# Patient Record
Sex: FEMALE | Race: BLACK OR AFRICAN AMERICAN | NOT HISPANIC OR LATINO | Employment: OTHER | URBAN - METROPOLITAN AREA
[De-identification: names, ages, dates, MRNs, and addresses within clinical notes are randomized per-mention and may not be internally consistent; named-entity substitution may affect disease eponyms.]

---

## 2017-07-19 ENCOUNTER — CARE COORDINATION (OUTPATIENT)
Dept: CARE COORDINATION | Facility: CLINIC | Age: 32
End: 2017-07-19

## 2017-07-19 NOTE — LETTER
[place] CARE COORDINATION  [facility]ly [facility] [address]  Phone: [phone]      July 19, 2017      Angeles De Souza  [address] [place] [address]    Dear Angeles,  I am the Clinic Care Coordinator that works with your primary care provider's clinic. I wanted to introduce myself and provide you with my contact information for you to be able to call me with any questions or concerns. Below is a description of what Clinic Care Coordination is and how I can further assist you.     The Clinic Care Coordinator role is a Registered Nurse and/or  who understands the health care system. The goal of Clinic Care Coordination is to help you manage your health and improve access to the [facility] system in the most efficient manner.  The Registered Nurse can assist you in meeting your health care goals by providing education, coordinating services, and strengthening the communication among your providers. The  can assist you with financial, behavioral, psychosocial, and chemical dependency and counseling/psychiatric resources.    Please feel free to keep this letter and contact information to contact me at [phone] with any further questions or concerns that may arise. We at [facility] are focused on providing you with the highest-quality healthcare experience possible and that all starts with you.       Sincerely,         Maggie Davidson RN  Clinic Care Coordinator   [facility]   Phone: [phone]

## 2017-07-19 NOTE — PROGRESS NOTES
Clinic Care Coordination Contact  [facility]/Voicemail    Referral Source: Pro-Active Outreach  Clinical Data: Care Coordinator Outreach  Outreach attempted x 1.  Left message on voicemail with call back information and requested return call.  Plan: Care Coordinator will mail out care coordination introduction letter with care coordinator contact information and explanation of care coordination services. Care Coordinator will try to reach patient again in 3-5 business days.

## 2017-08-31 NOTE — PROGRESS NOTES
Clinic Care Coordination Contact  [facility]/Voicemail    Referral Source: Pro-Active Outreach  Clinical Data: Care Coordinator Outreach  Outreach attempted x 2.   Plan: Care Coordinator mailed out care coordination introduction letter . Care Coordinator will try to reach patient again in 3-5 business days.

## 2017-09-13 NOTE — PROGRESS NOTES
Care Coordination Contact Attempt    Referral Source: pro active     Clinical Data: Clinic Care Coordinator RN attempted to contact patient three times.  Unable to contact letter was sent to patient . There has been no response from patient.        Plan:  Case closed.

## 2018-04-27 ENCOUNTER — OFFICE VISIT (OUTPATIENT)
Dept: FAMILY MEDICINE | Facility: CLINIC | Age: 33
End: 2018-04-27
Payer: COMMERCIAL

## 2018-04-27 VITALS
DIASTOLIC BLOOD PRESSURE: 62 MMHG | HEART RATE: 78 BPM | WEIGHT: 155 LBS | OXYGEN SATURATION: 100 % | SYSTOLIC BLOOD PRESSURE: 102 MMHG | TEMPERATURE: 99.5 F | BODY MASS INDEX: 25.79 KG/M2

## 2018-04-27 DIAGNOSIS — R30.0 DYSURIA: ICD-10-CM

## 2018-04-27 DIAGNOSIS — R11.0 NAUSEA: ICD-10-CM

## 2018-04-27 DIAGNOSIS — R10.30 ABDOMINAL PAIN, LOWER: Primary | ICD-10-CM

## 2018-04-27 LAB
ALBUMIN UR-MCNC: ABNORMAL MG/DL
APPEARANCE UR: ABNORMAL
BACTERIA #/AREA URNS HPF: ABNORMAL /HPF
BETA HCG QUAL IFA URINE: NEGATIVE
BILIRUB UR QL STRIP: NEGATIVE
COLOR UR AUTO: YELLOW
GLUCOSE UR STRIP-MCNC: NEGATIVE MG/DL
HGB UR QL STRIP: ABNORMAL
KETONES UR STRIP-MCNC: NEGATIVE MG/DL
LEUKOCYTE ESTERASE UR QL STRIP: NEGATIVE
MUCOUS THREADS #/AREA URNS LPF: PRESENT /LPF
NITRATE UR QL: NEGATIVE
NON-SQ EPI CELLS #/AREA URNS LPF: ABNORMAL /LPF
PH UR STRIP: 5.5 PH (ref 5–7)
RBC #/AREA URNS AUTO: ABNORMAL /HPF
SOURCE: ABNORMAL
SP GR UR STRIP: 1.02 (ref 1–1.03)
UROBILINOGEN UR STRIP-ACNC: 0.2 EU/DL (ref 0.2–1)
WBC #/AREA URNS AUTO: ABNORMAL /HPF

## 2018-04-27 PROCEDURE — 84703 CHORIONIC GONADOTROPIN ASSAY: CPT | Performed by: INTERNAL MEDICINE

## 2018-04-27 PROCEDURE — 99213 OFFICE O/P EST LOW 20 MIN: CPT | Performed by: INTERNAL MEDICINE

## 2018-04-27 PROCEDURE — 81001 URINALYSIS AUTO W/SCOPE: CPT | Performed by: INTERNAL MEDICINE

## 2018-04-27 PROCEDURE — 87086 URINE CULTURE/COLONY COUNT: CPT | Performed by: INTERNAL MEDICINE

## 2018-04-27 RX ORDER — PROMETHAZINE HYDROCHLORIDE 12.5 MG/1
12.5 TABLET ORAL EVERY 8 HOURS PRN
Qty: 20 TABLET | Refills: 1 | Status: SHIPPED | OUTPATIENT
Start: 2018-04-27 | End: 2019-03-16

## 2018-04-27 NOTE — MR AVS SNAPSHOT
"              After Visit Summary   2018    Angeles De Souza    MRN: [id number]           Patient Information     Date Of Birth          1985        Visit Information        Provider Department      2018 2:05 PM Eli Hendricks MD; [employer] [facility] [employer] [facility]        Today's Diagnoses     Dysuria    -  1    Nausea           Follow-ups after your visit        Follow-up notes from your care team     Return in about 1 week (around 2018) for if pain does not improve when period starts.      Who to contact     If you have questions or need follow up information about today's clinic visit or your schedule please contact [facility] directly at [phone].  Normal or non-critical lab and imaging results will be communicated to you by Lionexpohart, letter or phone within 4 business days after the clinic has received the results. If you do not hear from us within 7 days, please contact the clinic through Lionexpohart or phone. If you have a critical or abnormal lab result, we will notify you by phone as soon as possible.  Submit refill requests through Ezakus or call your pharmacy and they will forward the refill request to us. Please allow 3 business days for your refill to be completed.          Additional Information About Your Visit        Lionexpohart Information     Ezakus lets you send messages to your doctor, view your test results, renew your prescriptions, schedule appointments and more. To sign up, go to [email or web address] . Click on \"Log in\" on the left side of the screen, which will take you to the Welcome page. Then click on \"Sign up Now\" on the right side of the page.     You will be asked to enter the access code listed below, as well as some personal information. Please follow the directions to create your username and password.     Your access code is: [email or web address]  Expires: 2018  3:41 PM     Your access code will  in 90 days. If " you need help or a new code, please call your [facility] clinic or [phone].        Care EveryWhere ID     This is your Care EveryWhere ID. This could be used by other organizations to access your [facility] medical records  [id number]        Your Vitals Were     Pulse Temperature Last Period Pulse Oximetry BMI (Body Mass Index)       78 99.5  F (37.5  C) (Tympanic) 03/29/2018 100% 25.79 kg/m2        Blood Pressure from Last 3 Encounters:   04/27/18 102/62   08/02/16 100/60   04/27/16 100/68    Weight from Last 3 Encounters:   04/27/18 155 lb (70.3 kg)   08/02/16 150 lb (68 kg)   04/27/16 160 lb (72.6 kg)              We Performed the Following     *UA reflex to Microscopic and Culture ([place] and [facility] (except [place] and [facility] [place])     Beta HCG Qual, Urine - [facility] and [place] ([id number])     Urine Microscopic          Today's Medication Changes          These changes are accurate as of 4/27/18  3:41 PM.  If you have any questions, ask your nurse or doctor.               Start taking these medicines.        Dose/Directions    promethazine 12.5 MG tablet   Commonly known as:  PHENERGAN   Used for:  Nausea   Started by:  Eli Hendricks MD        Dose:  12.5 mg   Take 1 tablet (12.5 mg) by mouth every 8 hours as needed for nausea   Quantity:  20 tablet   Refills:  1            Where to get your medicines      These medications were sent to [facility] [place] [facility] [place] [place] [id number] [facility]  [address] [place] [address]     Phone:  [phone]     promethazine 12.5 MG tablet                Primary Care Provider Office Phone # Fax #    Estefany Lawrence [phone]       [address] [facility] [address]  [facility] [address]        Equal Access to Services     [facility] : Cathleen Thomson, waaxda luqadaha, qaybta kaalmabrody simpson, areli andrews . [facility] [phone].    ATENCIÓN: Si shaheen turner  roche disposición servicios gratuitos de asistencia lingüística. Winston bass [phone].    We comply with applicable federal civil rights laws and [place] laws. We do not discriminate on the basis of race, color, national origin, age, disability, sex, sexual orientation, or gender identity.            Thank you!     Thank you for choosing [facility]  for your care. Our goal is always to provide you with excellent care. Hearing back from our patients is one way we can continue to improve our services. Please take a few minutes to complete the written survey that you may receive in the mail after your visit with us. Thank you!             Your Updated Medication List - Protect others around you: Learn how to safely use, store and throw away your medicines at www.disposemymeds.org.          This list is accurate as of 4/27/18  3:41 PM.  Always use your most recent med list.                   Brand Name Dispense Instructions for use Diagnosis    polyethylene glycol powder    MIRALAX    510 g    Take 17 g by mouth daily    Constipation, External hemorrhoids       prenatal multivitamin plus iron 27-0.8 MG Tabs per tablet     100 tablet    Take 1 tablet by mouth daily    Pregnancy test positive       promethazine 12.5 MG tablet    PHENERGAN    20 tablet    Take 1 tablet (12.5 mg) by mouth every 8 hours as needed for nausea    Nausea

## 2018-04-27 NOTE — PROGRESS NOTES
SUBJECTIVE:   Angeles De Souza is a 32 year old female who presents to clinic today for the following health issues:      URINARY TRACT SYMPTOMS  Onset: x 1 one    Description:   Painful urination (Dysuria): YES  Blood in urine (Hematuria): no   Delay in urine (Hesitency): no     Intensity: moderate    Progression of Symptoms:  worsening    Accompanying Signs & Symptoms:  Fever/chills: no  Flank pain: no  Nausea and vomiting: YES  Any vaginal symptoms: none  Abdominal/Pelvic Pain: YES    History:   History of frequent UTI's: YES  History of kidney stones: no   Sexually Active: YES  Possibility of pregnancy: No    Precipitating factors:       Therapies Tried and outcome:     Angeles is here with pain across the lower abdomen and dysuria which is been going on for about a week.  She denies any current vaginal symptoms.  She has some nausea but no vomiting or diarrhea.  She has chronic constipation which has been well controlled recently with MiraLAX.  She is having normal periods.  She has some low back pain but this is chronic. No fevers.  She sometimes gets this sort of pain just before her period starts.    Would like pregnancy test.       Reviewed and updated as needed this visit by clinical staff  Tobacco  Allergies  Meds       Reviewed and updated as needed this visit by Provider         ROS:  Const, GI, , msk reviewed,  otherwise negative unless noted above.       OBJECTIVE:     /62  Pulse 78  Temp 99.5  F (37.5  C) (Tympanic)  Wt 155 lb (70.3 kg)  LMP 03/29/2018  SpO2 100%  BMI 25.79 kg/m2  Body mass index is 25.79 kg/(m^2).    Gen: well appearing, pleasant woman, no distress  Pulm: breathing comfortably, CTAB, no wheezes or rales  CV: RRR, normal S1 and S2, no murmurs  Abd: BS present, soft, + tenderness across lower abdomen without rebound or guarding, nondistended, no CVA tenderness      Diagnostic Test Results:  Results for orders placed or performed in visit on 04/27/18 (from the past 24  hour(s))   *UA reflex to Microscopic and Culture ([place] and [facility] (except [place] and [place])   Result Value Ref Range    Color Urine Yellow     Appearance Urine Slightly Cloudy     Glucose Urine Negative NEG^Negative mg/dL    Bilirubin Urine Negative NEG^Negative    Ketones Urine Negative NEG^Negative mg/dL    Specific Gravity Urine 1.025 1.003 - 1.035    Blood Urine Small (A) NEG^Negative    pH Urine 5.5 5.0 - 7.0 pH    Protein Albumin Urine Trace (A) NEG^Negative mg/dL    Urobilinogen Urine 0.2 0.2 - 1.0 EU/dL    Nitrite Urine Negative NEG^Negative    Leukocyte Esterase Urine Negative NEG^Negative    Source Midstream Urine    Urine Microscopic   Result Value Ref Range    WBC Urine 0 - 5 OTO5^0 - 5 /HPF    RBC Urine O - 2 OTO2^O - 2 /HPF    Squamous Epithelial /LPF Urine Many (A) FEW^Few /LPF    Bacteria Urine Moderate (A) NEG^Negative /HPF    Mucous Urine Present (A) NEG^Negative /LPF   Beta HCG Qual, Urine - [facility] and [place] ([id number])   Result Value Ref Range    Beta HCG Qual IFA Urine Negative NEG^Negative          ASSESSMENT/PLAN:             ICD-10-CM    1. Abdominal pain, lower R10.30    2. Dysuria R30.0 *UA reflex to Microscopic and Culture ([facility] and [facility] (except [place] and [place])     Beta HCG Qual, Urine - [facility] and [place] ([id number])     Urine Microscopic     Urine Culture Aerobic Bacterial   3. Nausea R11.0 promethazine (PHENERGAN) 12.5 MG tablet       Pain across the lower abdomen and some dysuria, urinalysis is unremarkable.  Will send for culture to double check.  Possible mild gastroenteritis versus.  Pain.  Recommended Tylenol, ibuprofen, heating pads.  Can try small dose of Phenergan for severe nausea, this should be okay with breast-feeding.  If pain is not improved after her period starts, please let me know and we will consider an ultrasound.    Follow up - as above.     Eli Hendricks MD  [facility]

## 2018-04-27 NOTE — NURSING NOTE
"Chief Complaint   Patient presents with     UTI       Initial /62  Pulse 78  Temp 99.5  F (37.5  C) (Tympanic)  Wt 155 lb (70.3 kg)  LMP 03/29/2018  SpO2 100%  BMI 25.79 kg/m2 Estimated body mass index is 25.79 kg/(m^2) as calculated from the following:    Height as of 8/2/16: 5' 5\" (1.651 m).    Weight as of this encounter: 155 lb (70.3 kg).  Medication Reconciliation: complete   Barbara Antunez CMA      "

## 2018-04-29 LAB
BACTERIA SPEC CULT: NORMAL
SPECIMEN SOURCE: NORMAL

## 2018-06-25 ENCOUNTER — HEALTH MAINTENANCE LETTER (OUTPATIENT)
Age: 33
End: 2018-06-25

## 2019-03-16 ENCOUNTER — HOSPITAL ENCOUNTER (EMERGENCY)
Facility: CLINIC | Age: 34
Discharge: HOME | End: 2019-03-16
Attending: EMERGENCY MEDICINE | Admitting: EMERGENCY MEDICINE
Payer: COMMERCIAL

## 2019-03-16 VITALS
TEMPERATURE: 98.1 F | DIASTOLIC BLOOD PRESSURE: 86 MMHG | SYSTOLIC BLOOD PRESSURE: 118 MMHG | HEART RATE: 73 BPM | OXYGEN SATURATION: 100 % | RESPIRATION RATE: 18 BRPM

## 2019-03-16 DIAGNOSIS — J01.90 ACUTE NON-RECURRENT SINUSITIS, UNSPECIFIED LOCATION: ICD-10-CM

## 2019-03-16 DIAGNOSIS — H65.91 OME (OTITIS MEDIA WITH EFFUSION), RIGHT: ICD-10-CM

## 2019-03-16 PROCEDURE — 99282 EMERGENCY DEPT VISIT SF MDM: CPT

## 2019-03-16 RX ORDER — AMOXICILLIN 500 MG/1
500 CAPSULE ORAL 3 TIMES DAILY
Qty: 21 CAPSULE | Refills: 0 | Status: SHIPPED | OUTPATIENT
Start: 2019-03-16 | End: 2019-04-23

## 2019-03-16 RX ORDER — NEOMYCIN SULFATE, POLYMYXIN B SULFATE, HYDROCORTISONE 3.5; 10000; 1 MG/ML; [USP'U]/ML; MG/ML
3 SOLUTION/ DROPS AURICULAR (OTIC) 4 TIMES DAILY
Qty: 3 ML | Refills: 0 | Status: SHIPPED | OUTPATIENT
Start: 2019-03-16 | End: 2019-04-23

## 2019-03-16 RX ORDER — OXYMETAZOLINE HYDROCHLORIDE 0.05 G/100ML
2 SPRAY NASAL 2 TIMES DAILY
Qty: 1 BOTTLE | Refills: 0 | Status: SHIPPED | OUTPATIENT
Start: 2019-03-16 | End: 2019-04-23

## 2019-03-16 ASSESSMENT — ENCOUNTER SYMPTOMS
FEVER: 1
RHINORRHEA: 1

## 2019-03-16 NOTE — ED PROVIDER NOTES
History     Chief Complaint:  Fever and ear pain    The history is provided by the patient.      Angeles De Souza is a 33 year old female who presents to the emergency department with her daughters for evaluation of a fever and ear pain. Starting about a week and a half ago, the patient reports experiencing bilateral ear pain, with drainage from the right ear, congestion, and a fever. She went to Urgent Care last week for these symptoms, more specifically for her ear pain, and they found no infection. Her two month old daughter is also sick. Her persisting symptoms, as well as her sick daughter prompted her to seek evaluation in the emergency room today.    Here, the patient continues to experience ear pain as well as sinus pressure and congestion. Of note, her son had a recent ear infection. She denies any recent travels.     Allergies:  Ondansetron  Zofran  Meclizine    Medications:    The patient is not currently taking any prescribed medications.    Past Medical History:    Seasonal allergic rhinitis  Chronic back pain  Schistosomiasis     Past Surgical History:    The patient does not have any pertinent past surgical history.    Family History:    CAD: Father    Social History:  Negative for tobacco use.  Negative for alcohol use.  Negative for drug use.  Marital Status:        Review of Systems   Constitutional: Positive for fever.   HENT: Positive for congestion, ear discharge, ear pain and rhinorrhea.    All other systems reviewed and are negative.      Physical Exam     Patient Vitals for the past 24 hrs:   BP Temp Temp src Pulse Heart Rate Resp SpO2   03/16/19 1653 118/86 98.1  F (36.7  C) Oral 73 73 18 100 %     Physical Exam    HEENT:         Normal conjunctiva, No  discharge           R TM dull, erythematous, bulging and air/fluid interface, ext ear normal, EAC with yellowish discharge         L  TM normal, external ear and EAC normal           Posterior oropharynx:               Yes erythema,                No exudates,               Tonsillar hypertrophy - no              uvula midline - yes     Neck: no adenopathy      Lungs:     clear to auscultation    Heart: Regular, no m/r/g, ppi    Neuro: alert, no focal gross focal deficits    Psych: Normal mood and affect    Emergency Department Course   Emergency Department Course:  Nursing notes and vitals reviewed. 1645 I performed an exam of the patient as documented above.     Findings and plan explained to the Patient. Patient discharged home with instructions regarding supportive care, medications, and reasons to return. The importance of close follow-up was reviewed. The patient was prescribed amoxil, cortisporin, and afrin nasal spray.    I personally answered all related questions prior to discharge.     Impression & Plan    Medical Decision Makin-year-old female with 10 days of upper respiratory tract infectious symptoms complaining of ongoing fevers right-sided otalgia and otorrhea from the right ear and pharyngitis.  Lungs are clear pharynx is unremarkable the right ear is changes consistent with otitis media.  We will treat her for this follow-up with her PCP as needed return with new or worsening symptoms.    Critical Care time:  none    Diagnosis:    ICD-10-CM    1. Acute non-recurrent sinusitis, unspecified location J01.90    2. OME (otitis media with effusion), right H65.91      Disposition:  discharged to home    Discharge Medications:     Medication List      Started    amoxicillin 500 MG capsule  Commonly known as:  AMOXIL  500 mg, Oral, 3 TIMES DAILY     neomycin-polymyxin-hydrocortisone 3.5-13784-2 otic solution  Commonly known as:  CORTISPORIN  3 drops, Right Ear, 4 TIMES DAILY     oxymetazoline 0.05 % nasal spray  Commonly known as:  AFRIN NASAL SPRAY  2 sprays, Nasal, 2 TIMES DAILY          Scribe Disclosure:  Kellie SAWYER am serving as a scribe on 3/16/2019 at 5:14 PM to personally document services performed by Anastacio Infante  Gerber, * based on my observations and the provider's statements to me.     Kellie Gits  3/16/2019   [facility] EMERGENCY DEPARTMENT       Anastacio Infante MD  03/16/19 8230

## 2019-03-16 NOTE — ED TRIAGE NOTES
Rt ear ache with discharge for 2 weeks accompanied by fever and generally not feeling well. VSS at this time, A&O x 4. Presents with daughters.

## 2019-03-16 NOTE — ED AVS SNAPSHOT
[facility] Emergency Department  [address]  [facility] [address]  Phone:  [phone]  Fax:  [phone]                                    Angeles De Souza   MRN: [id number]    Department:  [facility] Emergency Department   Date of Visit:  3/16/2019           After Visit Summary Signature Page    I have received my discharge instructions, and my questions have been answered. I have discussed any challenges I see with this plan with the nurse or doctor.    ..........................................................................................................................................  Patient/Patient Representative Signature      ..........................................................................................................................................  Patient Representative Print Name and Relationship to Patient    ..................................................               ................................................  Date                                   Time    ..........................................................................................................................................  Reviewed by Signature/Title    ...................................................              ..............................................  Date                                               Time          22EPIC Rev 08/18

## 2019-04-23 ENCOUNTER — OFFICE VISIT (OUTPATIENT)
Dept: FAMILY MEDICINE | Facility: CLINIC | Age: 34
End: 2019-04-23
Payer: COMMERCIAL

## 2019-04-23 VITALS
BODY MASS INDEX: 23.66 KG/M2 | WEIGHT: 142 LBS | DIASTOLIC BLOOD PRESSURE: 70 MMHG | TEMPERATURE: 99.2 F | HEIGHT: 65 IN | HEART RATE: 61 BPM | SYSTOLIC BLOOD PRESSURE: 114 MMHG | OXYGEN SATURATION: 99 %

## 2019-04-23 DIAGNOSIS — O24.410 DIET CONTROLLED GESTATIONAL DIABETES MELLITUS (GDM) IN THIRD TRIMESTER: Primary | ICD-10-CM

## 2019-04-23 DIAGNOSIS — K62.5 RECTAL BLEEDING: ICD-10-CM

## 2019-04-23 DIAGNOSIS — R53.83 OTHER FATIGUE: ICD-10-CM

## 2019-04-23 DIAGNOSIS — K64.4 EXTERNAL HEMORRHOIDS: ICD-10-CM

## 2019-04-23 DIAGNOSIS — K59.09 OTHER CONSTIPATION: ICD-10-CM

## 2019-04-23 LAB
ERYTHROCYTE [DISTWIDTH] IN BLOOD BY AUTOMATED COUNT: 14.4 % (ref 10–15)
HBA1C MFR BLD: 4.6 % (ref 0–5.6)
HCT VFR BLD AUTO: 35.4 % (ref 35–47)
HGB BLD-MCNC: 11.3 G/DL (ref 11.7–15.7)
MCH RBC QN AUTO: 30.3 PG (ref 26.5–33)
MCHC RBC AUTO-ENTMCNC: 31.9 G/DL (ref 31.5–36.5)
MCV RBC AUTO: 95 FL (ref 78–100)
PLATELET # BLD AUTO: 255 10E9/L (ref 150–450)
RBC # BLD AUTO: 3.73 10E12/L (ref 3.8–5.2)
WBC # BLD AUTO: 5.5 10E9/L (ref 4–11)

## 2019-04-23 PROCEDURE — 83036 HEMOGLOBIN GLYCOSYLATED A1C: CPT | Performed by: FAMILY MEDICINE

## 2019-04-23 PROCEDURE — 85027 COMPLETE CBC AUTOMATED: CPT | Performed by: FAMILY MEDICINE

## 2019-04-23 PROCEDURE — 99214 OFFICE O/P EST MOD 30 MIN: CPT | Performed by: FAMILY MEDICINE

## 2019-04-23 PROCEDURE — 84443 ASSAY THYROID STIM HORMONE: CPT | Performed by: FAMILY MEDICINE

## 2019-04-23 PROCEDURE — 80053 COMPREHEN METABOLIC PANEL: CPT | Performed by: FAMILY MEDICINE

## 2019-04-23 PROCEDURE — 36415 COLL VENOUS BLD VENIPUNCTURE: CPT | Performed by: FAMILY MEDICINE

## 2019-04-23 RX ORDER — DOCUSATE SODIUM 100 MG/1
100 CAPSULE, LIQUID FILLED ORAL 2 TIMES DAILY PRN
Qty: 30 CAPSULE | Refills: 0 | Status: SHIPPED | OUTPATIENT
Start: 2019-04-23 | End: 2019-07-30

## 2019-04-23 RX ORDER — POLYETHYLENE GLYCOL 3350 17 G/17G
1 POWDER, FOR SOLUTION ORAL DAILY
Qty: 510 G | Refills: 11 | Status: SHIPPED | OUTPATIENT
Start: 2019-04-23 | End: 2019-07-30

## 2019-04-23 ASSESSMENT — MIFFLIN-ST. JEOR: SCORE: 1353.11

## 2019-04-23 NOTE — PATIENT INSTRUCTIONS
Patient Education     Constipation (Adult)  Constipation means that you have bowel movements that are less frequent than usual. Stools often become very hard and difficult to pass.  Constipation is very common. At some point in life, it affects almost everyone. Since everyone's bowel habits are different, what is constipation to one person may not be to another. Your healthcare provider may do tests to diagnose constipation. It depends on what he or she finds when evaluating you.    Symptoms of constipation include:    Abdominal pain    Bloating    Vomiting    Painful bowel movements    Itching, swelling, bleeding, or pain around the anus  Causes  Constipation can have many causes. These include:    Diet low in fiber    Too much dairy    Not drinking enough liquids    Lack of exercise or physical activity (especially true for older adults)    Changes in lifestyle or daily routine, including pregnancy, aging, work, and travel    Frequent use or misuse of laxatives    Ignoring the urge to have a bowel movement or delaying it until later    Medicines, such as certain prescription pain medicines, iron supplements, antacids, certain antidepressants, and calcium supplements    Diseases like irritable bowel syndrome, bowel obstructions, stroke, diabetes, thyroid disease, Parkinson disease, hemorrhoids, and colon cancer  Complications  Potential complications of constipation can include:    Hemorrhoids    Rectal bleeding from hemorrhoids or anal fissures (skin tears)    Hernias    Dependency on laxatives    Chronic constipation    Fecal impaction, a severe form of constipation in which a large amount of hard stool is in your rectum that you can't pass    Bowel obstruction or perforation  Home care  All treatment should be done after talking with your healthcare provider. This is especially true if you have another medical problems, are taking prescription medicines, or are an older adult. Treatment most often involves  lifestyle changes. You may also need medicines. Your healthcare provider will tell you which will work best for you. Follow the advice below to help avoid this problem in the future.  Lifestyle changes  These lifestyle changes can help prevent constipation:    Diet. Eat a high-fiber diet, with fresh fruit and vegetables, and reduce dairy intake, meats, and processed foods    Fluids. It's important to get enough fluids each day. Drink plenty of water when you eat more fiber. If you are on diet that limits the amount of fluid you can have, talk about this with your healthcare provider.    Regular exercise. Check with your healthcare provider first.  Medicines  Take any medicines as directed. Some laxatives are safe to use only every now and then. Others can be taken on a regular basis. While laxatives don't cause bowel dependence, they are treating the symptoms. So your constipation may return if you don't make other changes. Talk with your healthcare provider or pharmacist if you have questions.  Prescription pain medicines can cause constipation. If you are taking this kind of medicine, ask your healthcare provider if you should also take a stool softener.  Medicines you may take to treat constipation include:    Fiber supplements    Stool softeners    Laxatives    Enemas    Rectal suppositories  Follow-up care  Follow up with your healthcare provider if symptoms don't get better in the next few days. You may need to have more tests or see a specialist.  Call 911  Call 911 if any of these occur:    Trouble breathing    Stiff, rigid abdomen that is severely painful to touch    Confusion    Fainting or loss of consciousness    Rapid heart rate    Chest pain  When to seek medical advice  Call your healthcare provider right away if any of these occur:    Fever of 100.4 F (38 C) or higher, or as directed by your healthcare provider    Failure to resume normal bowel movements    Pain in your abdomen or back gets  worse    Nausea or vomiting    Swelling in your abdomen    Blood in the stool    Black, tarry stool    Involuntary weight loss    Weakness  Date Last Reviewed: 6/1/2018 2000-2018 [employer]. [address] [place] [address], [place] [address]. All rights reserved. This information is not intended as a substitute for professional medical care. Always follow your healthcare professional's instructions.

## 2019-04-23 NOTE — PROGRESS NOTES
SUBJECTIVE:   Angeles De Souza is a 33 year old female who presents to clinic today for the following   health issues:      Gestational Diabetes Follow-up    Patient is checking blood sugars: 3  times daily. She is  postpartum almost 4 months now . Not taking med's. She thinks her  sugars are still slightly high sometimes   : Patient modifying lifestyle changes (diet, exercise) with blood sugars         Blood sugar testing :  Results are as follows:         am -          lunchtime - 123-140         suppertime - 140    Diabetic concerns: None     Symptoms of hypoglycemia (low blood sugar): none     Paresthesias (numbness or burning in feet) or sores: No     Date of last diabetic eye exam:     BP Readings from Last 2 Encounters:   03/16/19 118/86   04/27/18 102/62     No results found for: A1C, LDL    Diabetes Management Resources    Amount of exercise or physical activity: None    Problems taking medications regularly: No    Medication side effects: none    Diet: regular (no restrictions)    Hemorrhoids/ Rectal bleeding   Onset: ongoing recurrent problem x 3 years. Has flare up since last 3-4 weeks      Description:   Pain: YES  Itching: YES, sometimes it causes pain with bm     Accompanying Signs & Symptoms:  Blood streaked toilet paper: YES  Blood in stool: YES  Changes in stool pattern: YES- hard stool     History:   Any previous GI studies done:none  Family History of colon cancer: no     Precipitating factors:   None    Alleviating factors:  None    Therapies Tried and outcome: stool softener       Additional history: as documented    Reviewed  and updated as needed this visit by clinical staff         Reviewed and updated as needed this visit by Provider         Patient Active Problem List   Diagnosis     Other specified schistosomiasis     CARDIOVASCULAR SCREENING; LDL GOAL LESS THAN 160     Chronic back pain     Bilateral low back pain without sciatica     Seasonal allergic rhinitis     Past Surgical  "History:   Procedure Laterality Date     NO HISTORY OF SURGERY       Vaginal Delivery      January 2013       Social History     Tobacco Use     Smoking status: Never Smoker     Smokeless tobacco: Never Used   Substance Use Topics     Alcohol use: No     Alcohol/week: 0.0 oz     Family History   Problem Relation Age of Onset     Family History Negative No family hx of            ROS:  Constitutional, HEENT, cardiovascular, pulmonary, GI, , musculoskeletal, neuro, skin, endocrine and psych systems are negative, except as otherwise noted.    OBJECTIVE:     /70   Pulse 61   Temp 99.2  F (37.3  C) (Tympanic)   Ht 1.64 m (5' 4.57\")   Wt 64.4 kg (142 lb)   SpO2 99%   BMI 23.95 kg/m    Body mass index is 23.95 kg/m .  GENERAL: healthy, alert and no distress  EYES: Eyes grossly normal to inspection,  conjunctivae and sclerae normal  RESP: lungs clear to auscultation - no rales, rhonchi or wheezes  CV: regular rate and rhythm, normal S1 S2, no S3 or S4, no murmur, click or rub,   ABDOMEN: soft, nontender, no hepatosplenomegaly, no masses and bowel sounds normal  Rectal , has small ext hemorrhoid although has some rectal discomfort and increase sphincter tone, no acute bleeding       ASSESSMENT/PLAN:         (O24.410) Diet controlled gestational diabetes mellitus (GDM) in third trimester  (primary encounter diagnosis)  Comment:   Plan: Hemoglobin A1c, TSH with free T4 reflex,         Comprehensive metabolic panel   Discussed cares, diet/ exercise . Talked about DM concerns , health risk etc. Check lab, call pt with results.   Follow up as needed              (R53.83) Other fatigue  Comment: wants to have labs   Plan: Hemoglobin A1c, TSH with free T4 reflex,         Comprehensive metabolic panel, CBC with         Platelets            (K59.09) Other constipation  Comment:   Plan: polyethylene glycol (MIRALAX) powder    (K64.4) External hemorrhoids  Comment: hx of recurrent problem   Plan: hydrocortisone " (ANUSOL-HC) 2.5 % cream            (K62.5) Rectal bleeding  Comment: hemorrhoid  vs fissure   Plan: hydrocortisone (ANUSOL-HC) 2.5 % cream  discussed high fibre / fluid diet to regulate Bm.  gave Anusol cream  and colace to help.  also gave  refill on mirlax .  Talked about warning S/S for which should be seen.   Cares and symptomatic treatment discussed.   follow up if  recurrent or ongoing problem , consider further evaluation if needed.     Check labs. Script sent.Cares and  treatment discussed.  follow up if problem   Patient expressed understanding and agreement with treatment plan. All patient's questions were answered, will let me know if has more later.  Medications: Rx's: Reviewed the potential side effects/complications of medications prescribed.       Corinne Gill MD  [facility] [place]

## 2019-04-23 NOTE — LETTER
April 29, 2019      Angeles De Souza  [address]   [place] [address]        Dear ,    We are writing to inform you of your test results.    Normal thyroid ( TSH)   Normal  liver function tests, kidney functions, glucose and  electrolytes(various salts in your body)     Hemoglobin A1C ( DM test)  Looks good. You can continue to manage your blood sugar  with diet control and exercise. You should do recheck in 6- 8  months     Resulted Orders   Hemoglobin A1c   Result Value Ref Range    Hemoglobin A1C 4.6 0 - 5.6 %      Comment:      Normal <5.7% Prediabetes 5.7-6.4%  Diabetes 6.5% or higher - adopted from ADA   consensus guidelines.     TSH with free T4 reflex   Result Value Ref Range    TSH 1.96 0.40 - 4.00 mU/L   Comprehensive metabolic panel   Result Value Ref Range    Sodium 138 133 - 144 mmol/L    Potassium 4.1 3.4 - 5.3 mmol/L    Chloride 106 94 - 109 mmol/L    Carbon Dioxide 25 20 - 32 mmol/L    Anion Gap 7 3 - 14 mmol/L    Glucose 92 70 - 99 mg/dL      Comment:      Non Fasting    Urea Nitrogen 19 7 - 30 mg/dL    Creatinine 0.70 0.52 - 1.04 mg/dL    GFR Estimate >90 >60 mL/min/[1.73_m2]      Comment:      Non  GFR Calc  Starting 12/18/2018, serum creatinine based estimated GFR (eGFR) will be   calculated using the Chronic Kidney Disease Epidemiology Collaboration   (CKD-EPI) equation.      GFR Estimate If Black >90 >60 mL/min/[1.73_m2]      Comment:       GFR Calc  Starting 12/18/2018, serum creatinine based estimated GFR (eGFR) will be   calculated using the Chronic Kidney Disease Epidemiology Collaboration   (CKD-EPI) equation.      Calcium 9.2 8.5 - 10.1 mg/dL    Bilirubin Total 0.3 0.2 - 1.3 mg/dL    Albumin 4.1 3.4 - 5.0 g/dL    Protein Total 7.7 6.8 - 8.8 g/dL    Alkaline Phosphatase 96 40 - 150 U/L    ALT 34 0 - 50 U/L    AST 19 0 - 45 U/L   CBC with platelets   Result Value Ref Range    WBC 5.5 4.0 - 11.0 10e9/L    RBC Count 3.73 (L) 3.8 - 5.2  10e12/L    Hemoglobin 11.3 (L) 11.7 - 15.7 g/dL    Hematocrit 35.4 35.0 - 47.0 %    MCV 95 78 - 100 fl    MCH 30.3 26.5 - 33.0 pg    MCHC 31.9 31.5 - 36.5 g/dL    RDW 14.4 10.0 - 15.0 %    Platelet Count 255 150 - 450 10e9/L       If you have any questions or concerns, please call the clinic at the number listed above.       Sincerely,        Corinne Gill MD

## 2019-04-23 NOTE — LETTER
April 24, 2019      Angeles De Souza  [address]   [place] [address]        Dear ,    We are writing to inform you of your test results.    Hemoglobin A1C ( DM test)  Looks good. You can continue to manage your blood sugars  with diet control and exercise. You should do recheck in 4-6 months   Cbc test shows low Hemoglobin suggesting  Anemia. You should take iron supplement and recheck in 4 -6 weeks.   Awaiting other labs     Resulted Orders   Hemoglobin A1c   Result Value Ref Range    Hemoglobin A1C 4.6 0 - 5.6 %      Comment:      Normal <5.7% Prediabetes 5.7-6.4%  Diabetes 6.5% or higher - adopted from ADA   consensus guidelines.     CBC with platelets   Result Value Ref Range    WBC 5.5 4.0 - 11.0 10e9/L    RBC Count 3.73 (L) 3.8 - 5.2 10e12/L    Hemoglobin 11.3 (L) 11.7 - 15.7 g/dL    Hematocrit 35.4 35.0 - 47.0 %    MCV 95 78 - 100 fl    MCH 30.3 26.5 - 33.0 pg    MCHC 31.9 31.5 - 36.5 g/dL    RDW 14.4 10.0 - 15.0 %    Platelet Count 255 150 - 450 10e9/L       If you have any questions or concerns, please call the clinic at the number listed above.       Sincerely,        Corinne Gill MD

## 2019-04-24 LAB
ALBUMIN SERPL-MCNC: 4.1 G/DL (ref 3.4–5)
ALP SERPL-CCNC: 96 U/L (ref 40–150)
ALT SERPL W P-5'-P-CCNC: 34 U/L (ref 0–50)
ANION GAP SERPL CALCULATED.3IONS-SCNC: 7 MMOL/L (ref 3–14)
AST SERPL W P-5'-P-CCNC: 19 U/L (ref 0–45)
BILIRUB SERPL-MCNC: 0.3 MG/DL (ref 0.2–1.3)
BUN SERPL-MCNC: 19 MG/DL (ref 7–30)
CALCIUM SERPL-MCNC: 9.2 MG/DL (ref 8.5–10.1)
CHLORIDE SERPL-SCNC: 106 MMOL/L (ref 94–109)
CO2 SERPL-SCNC: 25 MMOL/L (ref 20–32)
CREAT SERPL-MCNC: 0.7 MG/DL (ref 0.52–1.04)
GFR SERPL CREATININE-BSD FRML MDRD: >90 ML/MIN/{1.73_M2}
GLUCOSE SERPL-MCNC: 92 MG/DL (ref 70–99)
POTASSIUM SERPL-SCNC: 4.1 MMOL/L (ref 3.4–5.3)
PROT SERPL-MCNC: 7.7 G/DL (ref 6.8–8.8)
SODIUM SERPL-SCNC: 138 MMOL/L (ref 133–144)
TSH SERPL DL<=0.005 MIU/L-ACNC: 1.96 MU/L (ref 0.4–4)

## 2019-07-30 ENCOUNTER — OFFICE VISIT (OUTPATIENT)
Dept: FAMILY MEDICINE | Facility: CLINIC | Age: 34
End: 2019-07-30
Payer: COMMERCIAL

## 2019-07-30 VITALS
SYSTOLIC BLOOD PRESSURE: 102 MMHG | OXYGEN SATURATION: 100 % | BODY MASS INDEX: 23.82 KG/M2 | HEIGHT: 65 IN | WEIGHT: 143 LBS | DIASTOLIC BLOOD PRESSURE: 64 MMHG | HEART RATE: 75 BPM | TEMPERATURE: 99.1 F

## 2019-07-30 DIAGNOSIS — Z32.01 PREGNANCY TEST POSITIVE: ICD-10-CM

## 2019-07-30 DIAGNOSIS — N91.2 AMENORRHEA: Primary | ICD-10-CM

## 2019-07-30 LAB — HCG UR QL: POSITIVE

## 2019-07-30 PROCEDURE — 99213 OFFICE O/P EST LOW 20 MIN: CPT | Performed by: FAMILY MEDICINE

## 2019-07-30 PROCEDURE — 81025 URINE PREGNANCY TEST: CPT | Performed by: FAMILY MEDICINE

## 2019-07-30 RX ORDER — PRENATAL VIT/IRON FUM/FOLIC AC 27MG-0.8MG
1 TABLET ORAL DAILY
Qty: 100 TABLET | Refills: 3 | Status: SHIPPED | OUTPATIENT
Start: 2019-07-30 | End: 2019-07-30

## 2019-07-30 RX ORDER — PRENATAL VIT/IRON FUM/FOLIC AC 27MG-0.8MG
1 TABLET ORAL DAILY
Qty: 100 TABLET | Refills: 3 | Status: SHIPPED | OUTPATIENT
Start: 2019-07-30 | End: 2020-09-29

## 2019-07-30 RX ORDER — PRENATAL WITH FERROUS FUM AND FOLIC ACID 3080; 920; 120; 400; 22; 1.84; 3; 20; 10; 1; 12; 200; 27; 25; 2 [IU]/1; [IU]/1; MG/1; [IU]/1; MG/1; MG/1; MG/1; MG/1; MG/1; MG/1; UG/1; MG/1; MG/1; MG/1; MG/1
1 TABLET ORAL DAILY
Qty: 90 TABLET | Refills: 3 | Status: SHIPPED | OUTPATIENT
Start: 2019-07-30 | End: 2020-11-11

## 2019-07-30 ASSESSMENT — MIFFLIN-ST. JEOR: SCORE: 1352.64

## 2019-07-30 NOTE — PROGRESS NOTES
Subjective     Angeles De Souza is a 34 year old female who presents to clinic today for the following health issues:    HPI   Acute Illness   Acute illness concerns: fatigue and nausea  Onset: x one week    Fever: no    Chills/Sweats: no    Headache (location?): no    Sinus Pressure:no    Conjunctivitis:  no    Ear Pain: no    Rhinorrhea: no    Congestion: no    Sore Throat: no     Cough: no    Wheeze: no    Decreased Appetite: no    Nausea: YES    Vomiting: no    Diarrhea:  no    Dysuria/Freq.: no    Fatigue/Achiness: YES    Sick/Strep Exposure: no     Therapies Tried and outcome:         Patient Active Problem List   Diagnosis     Other specified schistosomiasis     CARDIOVASCULAR SCREENING; LDL GOAL LESS THAN 160     Chronic back pain     Bilateral low back pain without sciatica     Seasonal allergic rhinitis     Other constipation     External hemorrhoids     Rectal bleeding     Diet controlled gestational diabetes mellitus (GDM) in third trimester     Past Surgical History:   Procedure Laterality Date     NO HISTORY OF SURGERY       Vaginal Delivery      January 2013       Social History     Tobacco Use     Smoking status: Never Smoker     Smokeless tobacco: Never Used   Substance Use Topics     Alcohol use: No     Alcohol/week: 0.0 oz     Family History   Problem Relation Age of Onset     Family History Negative No family hx of          Current Outpatient Medications   Medication Sig Dispense Refill     Prenatal Vit-Fe Fumarate-FA (PRENATAL MULTIVITAMIN W/IRON) 27-0.8 MG tablet Take 1 tablet by mouth daily 100 tablet 3     Allergies   Allergen Reactions     Ondansetron Rash         Reviewed and updated as needed this visit by Provider  Tobacco  Allergies  Med Hx  Soc Hx        Review of Systems   ROS COMP: CONSTITUTIONAL: NEGATIVE for fever, chills, change in weight  ENT/MOUTH: NEGATIVE for ear, mouth and throat problems  RESP: NEGATIVE for significant cough or SOB  CV: NEGATIVE for chest pain, palpitations  "or peripheral edema      Objective    /64   Pulse 75   Temp 99.1  F (37.3  C) (Tympanic)   Ht 1.64 m (5' 4.57\")   Wt 64.9 kg (143 lb)   LMP 06/15/2019 (Within Days)   SpO2 100%   BMI 24.12 kg/m    Body mass index is 24.12 kg/m .  Physical Exam   GENERAL: healthy, alert and no distress  NECK: no adenopathy, no asymmetry, masses, or scars and thyroid normal to palpation  RESP: lungs clear to auscultation - no rales, rhonchi or wheezes  CV: regular rate and rhythm, normal S1 S2, no S3 or S4, no murmur, click or rub, no peripheral edema and peripheral pulses strong  ABDOMEN: soft, nontender, no hepatosplenomegaly, no masses and bowel sounds normal          Results for orders placed or performed in visit on 07/30/19   HCG Qual, Urine ([id number])   Result Value Ref Range    HCG Qual Urine Positive (A) NEG^Negative       Assessment & Plan     1. Amenorrhea  Patient with amenorrhea tested for pregnancy.  Pregnancy test is positive.  - HCG Qual, Urine ([id number])    2. Pregnancy test positive  Recommending to take prenatal vitamins daily.  Instructed to see OB/GYN.  She  would like to see her OB/GYN at [facility].  Recommending to call to make an appointment  - Prenatal Vit-Fe Fumarate-FA (PRENATAL MULTIVITAMIN W/IRON) 27-0.8 MG tablet; Take 1 tablet by mouth daily  Dispense: 100 tablet; Refill: 3         Gustavo Thompson MD  [facility] [place]      "

## 2020-04-01 ENCOUNTER — VIRTUAL VISIT (OUTPATIENT)
Dept: FAMILY MEDICINE | Facility: CLINIC | Age: 35
End: 2020-04-01
Payer: COMMERCIAL

## 2020-04-01 DIAGNOSIS — J01.00 ACUTE NON-RECURRENT MAXILLARY SINUSITIS: Primary | ICD-10-CM

## 2020-04-01 PROCEDURE — 99441 ZZC PHYSICIAN TELEPHONE EVALUATION 5-10 MIN: CPT | Performed by: FAMILY MEDICINE

## 2020-04-01 NOTE — PROGRESS NOTES
"Subjective     Angeles De Souza is a 34 year old female who is being evaluated via a billable telephone visit.      The patient has been notified of following:     \"This telephone visit will be conducted via a call between you and your physician/provider. We have found that certain health care needs can be provided without the need for a physical exam.  This service lets us provide the care you need with a short phone conversation.  If a prescription is necessary we can send it directly to your pharmacy.  If lab work is needed we can place an order for that and you can then stop by our lab to have the test done at a later time.    If during the course of the call the physician/provider feels a telephone visit is not appropriate, you will not be charged for this service.\"     Patient has given verbal consent for Telephone visit?  Yes    Agneles De Souza complains of   Chief Complaint   Patient presents with     Pharyngitis       ALLERGIES  Ondansetron    Acute Illness   Acute illness concerns: sore throat, runny nose  Onset: 1 week. Recent pregnancy/delivery 3/21    Fever: no    Chills/Sweats: no    Headache (location?): no    Sinus Pressure:yes    Conjunctivitis:  no    Ear Pain: yes on both sides     Rhinorrhea: YES    Congestion: YES    Sore Throat: YES     Cough: no    Wheeze: no    Decreased Appetite: no    Nausea: no    Vomiting: no    Diarrhea:  no    Dysuria/Freq.: no    Fatigue/Achiness: no    Sick/Strep Exposure: no     Therapies Tried and outcome: teresa        Patient reports history of seasonal allergies.  Currently breast-feeding.  Recently had a vaginal birth.  Denies any abdominal pain.  No other sick contacts.      Patient Active Problem List   Diagnosis     Other specified schistosomiasis     CARDIOVASCULAR SCREENING; LDL GOAL LESS THAN 160     Chronic back pain     Bilateral low back pain without sciatica     Seasonal allergic rhinitis     Other constipation     External hemorrhoids     Rectal bleeding "     Diet controlled gestational diabetes mellitus (GDM) in third trimester     Past Surgical History:   Procedure Laterality Date     NO HISTORY OF SURGERY       Vaginal Delivery      January 2013       Social History     Tobacco Use     Smoking status: Never Smoker     Smokeless tobacco: Never Used   Substance Use Topics     Alcohol use: No     Alcohol/week: 0.0 standard drinks     Family History   Problem Relation Age of Onset     Family History Negative No family hx of          Current Outpatient Medications   Medication Sig Dispense Refill     PRENATAL 27-1 MG TABS Take 1 tablet by mouth daily (Patient not taking: Reported on 4/1/2020) 90 tablet 3     Prenatal Vit-Fe Fumarate-FA (PRENATAL MULTIVITAMIN W/IRON) 27-0.8 MG tablet Take 1 tablet by mouth daily (Patient not taking: Reported on 4/1/2020) 100 tablet 3     Allergies   Allergen Reactions     Ondansetron Rash       Reviewed and updated as needed this visit by Provider         Review of Systems   ROS COMP: CONSTITUTIONAL: NEGATIVE for fever, chills, change in weight  GI: NEGATIVE for nausea, abdominal pain, heartburn, or change in bowel habits       Objective   Reported vitals:  There were no vitals taken for this visit.   alert and no distress  Psych: Alert and oriented times 3; coherent speech, normal   rate and volume, able to articulate logical thoughts, able   to abstract reason, no tangential thoughts, no hallucinations   or delusions               Assessment/Plan:  1. Acute non-recurrent maxillary sinusitis  Starting the patient on Augmentin for acute sinus infection.  Recommending to stay well-hydrated.  If symptoms are not improving, she may add Claritin once daily which is over-the-counter.  She is currently breast-feeding.  Both medications should be safe to use during breast-feeding.  She may also take ibuprofen or Tylenol for discomfort.  If symptoms worsen or she develops a fever, instructed to notify us back.  Patient verbalized understanding  and agreement.  - amoxicillin-clavulanate (AUGMENTIN) 875-125 MG tablet; Take 1 tablet by mouth 2 times daily for 10 days  Dispense: 20 tablet; Refill: 0    No follow-ups on file.      Phone call duration:  6 minutes    Gustavo Thompson MD

## 2020-05-21 ENCOUNTER — TRANSFERRED RECORDS (OUTPATIENT)
Dept: HEALTH INFORMATION MANAGEMENT | Facility: CLINIC | Age: 35
End: 2020-05-21

## 2020-05-21 LAB — HPV ABSTRACT: NORMAL

## 2020-09-29 ENCOUNTER — OFFICE VISIT (OUTPATIENT)
Dept: FAMILY MEDICINE | Facility: CLINIC | Age: 35
End: 2020-09-29
Payer: COMMERCIAL

## 2020-09-29 VITALS
RESPIRATION RATE: 14 BRPM | WEIGHT: 152.8 LBS | HEIGHT: 65 IN | SYSTOLIC BLOOD PRESSURE: 110 MMHG | BODY MASS INDEX: 25.46 KG/M2 | DIASTOLIC BLOOD PRESSURE: 60 MMHG | OXYGEN SATURATION: 98 % | TEMPERATURE: 97.4 F | HEART RATE: 71 BPM

## 2020-09-29 DIAGNOSIS — D64.9 ANEMIA, UNSPECIFIED TYPE: ICD-10-CM

## 2020-09-29 DIAGNOSIS — K64.8 INTERNAL HEMORRHOID: Primary | ICD-10-CM

## 2020-09-29 DIAGNOSIS — K59.00 CONSTIPATION, UNSPECIFIED CONSTIPATION TYPE: ICD-10-CM

## 2020-09-29 DIAGNOSIS — Z23 NEED FOR PROPHYLACTIC VACCINATION AND INOCULATION AGAINST INFLUENZA: ICD-10-CM

## 2020-09-29 LAB
ERYTHROCYTE [DISTWIDTH] IN BLOOD BY AUTOMATED COUNT: 13.8 % (ref 10–15)
HCT VFR BLD AUTO: 34.1 % (ref 35–47)
HGB BLD-MCNC: 11.7 G/DL (ref 11.7–15.7)
MCH RBC QN AUTO: 31.2 PG (ref 26.5–33)
MCHC RBC AUTO-ENTMCNC: 34.3 G/DL (ref 31.5–36.5)
MCV RBC AUTO: 91 FL (ref 78–100)
PLATELET # BLD AUTO: 230 10E9/L (ref 150–450)
RBC # BLD AUTO: 3.75 10E12/L (ref 3.8–5.2)
WBC # BLD AUTO: 4 10E9/L (ref 4–11)

## 2020-09-29 PROCEDURE — 99214 OFFICE O/P EST MOD 30 MIN: CPT | Mod: 25 | Performed by: FAMILY MEDICINE

## 2020-09-29 PROCEDURE — 90471 IMMUNIZATION ADMIN: CPT | Performed by: FAMILY MEDICINE

## 2020-09-29 PROCEDURE — 84443 ASSAY THYROID STIM HORMONE: CPT | Performed by: FAMILY MEDICINE

## 2020-09-29 PROCEDURE — 85027 COMPLETE CBC AUTOMATED: CPT | Performed by: FAMILY MEDICINE

## 2020-09-29 PROCEDURE — 36415 COLL VENOUS BLD VENIPUNCTURE: CPT | Performed by: FAMILY MEDICINE

## 2020-09-29 PROCEDURE — 90682 RIV4 VACC RECOMBINANT DNA IM: CPT | Performed by: FAMILY MEDICINE

## 2020-09-29 RX ORDER — DOCUSATE SODIUM 100 MG/1
100 CAPSULE, LIQUID FILLED ORAL 2 TIMES DAILY
Qty: 60 CAPSULE | Refills: 1 | Status: SHIPPED | OUTPATIENT
Start: 2020-09-29 | End: 2021-09-27

## 2020-09-29 RX ORDER — COPPER 313.4 MG/1
1 INTRAUTERINE DEVICE INTRAUTERINE ONCE
Qty: 1 EACH | Refills: 0 | COMMUNITY
Start: 2020-09-29 | End: 2020-09-29

## 2020-09-29 RX ORDER — COPPER 313.4 MG/1
1 INTRAUTERINE DEVICE INTRAUTERINE ONCE
Qty: 1 EACH | Refills: 0 | COMMUNITY
Start: 2020-06-29 | End: 2021-09-27

## 2020-09-29 RX ORDER — HYDROCORTISONE ACETATE 25 MG/1
25 SUPPOSITORY RECTAL 2 TIMES DAILY
Qty: 6 SUPPOSITORY | Refills: 3 | Status: SHIPPED | OUTPATIENT
Start: 2020-09-29 | End: 2020-10-02

## 2020-09-29 RX ORDER — MULTIVIT-MIN/IRON/FOLIC ACID/K 18-600-40
1000 CAPSULE ORAL
COMMUNITY

## 2020-09-29 ASSESSMENT — MIFFLIN-ST. JEOR: SCORE: 1392.15

## 2020-09-29 NOTE — PROGRESS NOTES
Subjective     Angeles De Souza is a 35 year old female who presents to clinic today for the following health issues:    HPI       Hemorrhoids  Onset/Duration: march   Description:   Maddie-anal lump: YES  Pain: YES  Itching: no  Accompanying Signs & Symptoms:  Blood in stool: YES  Changes in stool pattern: Yes complains of constipation.  She has had more constipation lately she tells that she has had anemia as well in the past.  Denies any shortness of breath or chest pain.  Denies any fatigue.  She feels well  History:   Any previous GI studies done:none  Family History of colon cancer: no  Precipitating factors:   None  Alleviating factors:  None  Therapies tried and outcome: none      Review of Systems   CONSTITUTIONAL: NEGATIVE for fever, chills, change in weight  ENT/MOUTH: NEGATIVE for ear, mouth and throat problems  RESP: NEGATIVE for significant cough or SOB  CV: NEGATIVE for chest pain, palpitations or peripheral edema      Objective    There were no vitals taken for this visit.  There is no height or weight on file to calculate BMI.  Physical Exam   GENERAL: healthy, alert and no distress  NECK: no adenopathy, no asymmetry, masses, or scars and thyroid normal to palpation  RESP: lungs clear to auscultation - no rales, rhonchi or wheezes  CV: regular rate and rhythm, normal S1 S2, no S3 or S4, no murmur, click or rub, no peripheral edema and peripheral pulses strong  ABDOMEN: soft, nontender, no hepatosplenomegaly, no masses and bowel sounds normal  RECTAL (female): No external hemorrhoids noted.  No active bleeding.  MS: no gross musculoskeletal defects noted, no edema            Assessment & Plan     Internal hemorrhoid  Recommended to use Anusol suppository for internal hemorrhoids.  She may use ibuprofen for discomfort as needed.  If symptoms are not improving, instructed to notify me back in the next few days.  - hydrocortisone (ANUSOL-HC) 25 MG suppository; Place 1 suppository (25 mg) rectally 2 times  "daily for 3 days    Constipation, unspecified constipation type  Increase fluid and fiber intake in diet.  Use stool softeners for constipation  - docusate sodium (COLACE) 100 MG capsule; Take 1 capsule (100 mg) by mouth 2 times daily  - TSH with free T4 reflex    Anemia, unspecified type  Patient is noted to have anemia in the past.  Recheck labs ordered  - CBC with platelets    Need for prophylactic vaccination and inoculation against influenza    - Vaccine Administration, Initial.  - FLU VAC, QUADRIVALENT (RIV4) RECOMBINANT DNA, IM     BMI:   Estimated body mass index is 25.77 kg/m  as calculated from the following:    Height as of this encounter: 1.64 m (5' 4.57\").    Weight as of this encounter: 69.3 kg (152 lb 12.8 oz).           Return in about 2 weeks (around 10/13/2020) for If symptoms are not improving.    Gustavo Thompson MD  [facility] [place]    "

## 2020-09-29 NOTE — LETTER
September 30, 2020      Angeles De Souza  [address] [address]        Dear ,    I have reviewed your recent labs. Here are the results:    -All of your labs are within normal range.    Results for orders placed or performed in visit on 09/29/20   CBC with platelets     Status: Abnormal   Result Value Ref Range    WBC 4.0 4.0 - 11.0 10e9/L    Hemoglobin 11.7 11.7 - 15.7 g/dL    MCV 91 78 - 100 fl    MCH 31.2 26.5 - 33.0 pg    MCHC 34.3 31.5 - 36.5 g/dL    RDW 13.8 10.0 - 15.0 %    Platelet Count 230 150 - 450 10e9/L   TSH with free T4 reflex     Status: None   Result Value Ref Range    TSH 0.97 0.40 - 4.00 mU/L             If you have any questions or concerns, please call the clinic at the number listed above.       Sincerely,        Gustavo Thompson MD

## 2020-09-30 LAB — TSH SERPL DL<=0.005 MIU/L-ACNC: 0.97 MU/L (ref 0.4–4)

## 2020-10-13 ENCOUNTER — TELEPHONE (OUTPATIENT)
Dept: FAMILY MEDICINE | Facility: CLINIC | Age: 35
End: 2020-10-13

## 2020-10-13 NOTE — TELEPHONE ENCOUNTER
Central Prior Authorization Team   Phone: [phone]      PA Initiation    Medication: hydrocortisone (ANUSOL-HC) 25 MG suppository - INITIATED  Insurance Company: [employer] - Phone [phone] Fax [phone]  Pharmacy Filling the Rx: [id number] - [place] - [address] AT [facility] [address]  Filling Pharmacy Phone: [phone]  Filling Pharmacy Fax: [phone]  Start Date: 10/13/2020

## 2020-10-13 NOTE — TELEPHONE ENCOUNTER
Prior Authorization Retail Medication Request    Medication/Dose: hydrocortisone (ANUSOL-HC) 25 MG suppository    ICD code (if different than what is on RX):     Previously Tried and Failed:     Rationale:       Insurance Name:     Insurance ID:         Pharmacy Information (if different than what is on RX)  Name:     Phone:        Patient ID#: [id number]

## 2020-10-14 NOTE — TELEPHONE ENCOUNTER
Central Prior Authorization Team   Phone: [phone]      Prior Authorization Approval    Authorization Effective Date: 9/13/2020  Authorization Expiration Date: 10/13/2021  Medication: hydrocortisone (ANUSOL-HC) 25 MG suppository - APPROVED  Approved Dose/Quantity: 6 FOR 3  Reference #:     Insurance Company: [id number] - Phone [phone] Fax [phone]  Expected CoPay:       CoPay Card Available:      Foundation Assistance Needed:    Which Pharmacy is filling the prescription (Not needed for infusion/clinic administered): [id number] - [place], [place] - [address] AT [facility] [address]  Pharmacy Notified: Yes  Patient Notified: Yes (**Instructed pharmacy to notify patient when script is ready to /ship.**)

## 2020-11-11 DIAGNOSIS — Z32.01 PREGNANCY TEST POSITIVE: ICD-10-CM

## 2020-11-11 RX ORDER — PRENATAL WITH FERROUS FUM AND FOLIC ACID 3080; 920; 120; 400; 22; 1.84; 3; 20; 10; 1; 12; 200; 27; 25; 2 [IU]/1; [IU]/1; MG/1; [IU]/1; MG/1; MG/1; MG/1; MG/1; MG/1; MG/1; UG/1; MG/1; MG/1; MG/1; MG/1
1 TABLET ORAL DAILY
Qty: 90 TABLET | Refills: 3 | Status: SHIPPED | OUTPATIENT
Start: 2020-11-11

## 2021-04-29 ENCOUNTER — NURSE TRIAGE (OUTPATIENT)
Dept: FAMILY MEDICINE | Facility: CLINIC | Age: 36
End: 2021-04-29

## 2021-04-29 NOTE — TELEPHONE ENCOUNTER
"    Reason for Disposition    Sounds like a severe, unusual reaction to the triager    Additional Information    Negative: [1] Difficulty breathing or swallowing AND [2] starts within 2 hours after injection    Negative: Sounds like a life-threatening emergency to the triager    Negative: [1] COVID-19 exposure AND [2] no symptoms    Negative: [1] Typical COVID-19 symptoms AND [2] symptoms that are NOT expected from vaccine (e.g., cough, difficulty breathing, loss of taste or smell, runny nose, sore throat)    Negative: [1] Typical COVID-19 symptoms AND [2] started > 3 days after getting vaccine    Answer Assessment - Initial Assessment Questions  1. MAIN CONCERN OR SYMPTOM:  \"What is your main concern right now?\" \"What question do you have?\" \"What's the main symptom you're worried about?\" (e.g., fever, pain, redness, swelling) left side of body pressure, heart beating really fast where pt can feel it, fever feels very hot but has not taken temp, both arms are weak, and body aches        2. VACCINE: \"What vaccination did you receive?\" \"Is this your first or second shot?\" (e.g., none; Moderna, Pfizer, other)      Pfizer on Tuesday 4/27  3. SYMPTOM ONSET: \"When did the symptoms begin?\" (e.g., not relevant; hours, days)       Yesterday with fever and body aches  4. SYMPTOM SEVERITY: \"How bad is it?\"       Left sided pressure and heart are really bad.  States the fever is really high  5. FEVER: \"Is there a fever?\" If so, ask: \"What is it, how was it measured, and when did it start?\"       Yes, started yesterday but has not taken temp  6. PAST REACTIONS: \"Have you reacted to immunizations before?\" If so, ask: \"What happened?\"      Fever and body aches with the first vaccine  7. OTHER SYMPTOMS: \"Do you have any other symptoms?\"      no    Protocols used: CORONAVIRUS (COVID-19) VACCINE QUESTIONS AND [id number] 1.3    GO TO ED NOW (OR PCP TRIAGE):  * IF NO PCP (PRIMARY CARE PROVIDER) SECOND-LEVEL TRIAGE: You need to be " seen within the next hour. Go to the ED/UCC at [facility] Leave as soon as you can.  * IF PCP SECOND-LEVEL TRIAGE REQUIRED: You may need to be seen. Your doctor (or NP/PA) will want to talk with you to decide what's best. I'll page the provider on-call now. If you haven't heard from the provider (or me) within 30 minutes, go directly to the ED/UCC at [facility]      CARE ADVICE given per Coronavirus (COVID-19) - Vaccine Questions and Reactions (Adult) guideline.      Patient/Caregiver understands and will follow care advice? Yes, plans to follow advice     Anna MCCRAY RN  EP Triage

## 2021-05-17 ENCOUNTER — OFFICE VISIT (OUTPATIENT)
Dept: FAMILY MEDICINE | Facility: CLINIC | Age: 36
End: 2021-05-17
Payer: COMMERCIAL

## 2021-05-17 VITALS
OXYGEN SATURATION: 99 % | TEMPERATURE: 98.5 F | WEIGHT: 155 LBS | DIASTOLIC BLOOD PRESSURE: 64 MMHG | BODY MASS INDEX: 26.14 KG/M2 | HEART RATE: 66 BPM | SYSTOLIC BLOOD PRESSURE: 100 MMHG

## 2021-05-17 DIAGNOSIS — E55.9 VITAMIN D DEFICIENCY: ICD-10-CM

## 2021-05-17 DIAGNOSIS — J30.2 SEASONAL ALLERGIC RHINITIS, UNSPECIFIED TRIGGER: Primary | ICD-10-CM

## 2021-05-17 DIAGNOSIS — Z13.0 SCREENING FOR DEFICIENCY ANEMIA: ICD-10-CM

## 2021-05-17 DIAGNOSIS — H61.23 BILATERAL IMPACTED CERUMEN: ICD-10-CM

## 2021-05-17 LAB
BASOPHILS # BLD AUTO: 0 10E9/L (ref 0–0.2)
BASOPHILS NFR BLD AUTO: 0.5 %
DIFFERENTIAL METHOD BLD: ABNORMAL
EOSINOPHIL # BLD AUTO: 0.2 10E9/L (ref 0–0.7)
EOSINOPHIL NFR BLD AUTO: 3.8 %
ERYTHROCYTE [DISTWIDTH] IN BLOOD BY AUTOMATED COUNT: 13.8 % (ref 10–15)
FERRITIN SERPL-MCNC: 32 NG/ML (ref 12–150)
HCT VFR BLD AUTO: 32.7 % (ref 35–47)
HGB BLD-MCNC: 11.2 G/DL (ref 11.7–15.7)
LYMPHOCYTES # BLD AUTO: 2 10E9/L (ref 0.8–5.3)
LYMPHOCYTES NFR BLD AUTO: 47.7 %
MCH RBC QN AUTO: 31.1 PG (ref 26.5–33)
MCHC RBC AUTO-ENTMCNC: 34.3 G/DL (ref 31.5–36.5)
MCV RBC AUTO: 91 FL (ref 78–100)
MONOCYTES # BLD AUTO: 0.3 10E9/L (ref 0–1.3)
MONOCYTES NFR BLD AUTO: 6.7 %
NEUTROPHILS # BLD AUTO: 1.7 10E9/L (ref 1.6–8.3)
NEUTROPHILS NFR BLD AUTO: 41.3 %
PLATELET # BLD AUTO: 226 10E9/L (ref 150–450)
RBC # BLD AUTO: 3.6 10E12/L (ref 3.8–5.2)
WBC # BLD AUTO: 4.2 10E9/L (ref 4–11)

## 2021-05-17 PROCEDURE — 85025 COMPLETE CBC W/AUTO DIFF WBC: CPT | Performed by: PHYSICIAN ASSISTANT

## 2021-05-17 PROCEDURE — 99214 OFFICE O/P EST MOD 30 MIN: CPT | Performed by: PHYSICIAN ASSISTANT

## 2021-05-17 PROCEDURE — 82728 ASSAY OF FERRITIN: CPT | Performed by: PHYSICIAN ASSISTANT

## 2021-05-17 PROCEDURE — 36415 COLL VENOUS BLD VENIPUNCTURE: CPT | Performed by: PHYSICIAN ASSISTANT

## 2021-05-17 PROCEDURE — 82306 VITAMIN D 25 HYDROXY: CPT | Performed by: PHYSICIAN ASSISTANT

## 2021-05-17 RX ORDER — MULTIVIT-MIN/IRON/FOLIC ACID/K 18-600-40
CAPSULE ORAL
COMMUNITY

## 2021-05-17 RX ORDER — FLUTICASONE PROPIONATE 50 MCG
2 SPRAY, SUSPENSION (ML) NASAL DAILY
Qty: 16 G | Refills: 3 | Status: SHIPPED | OUTPATIENT
Start: 2021-05-17 | End: 2021-09-27

## 2021-05-17 RX ORDER — CETIRIZINE HYDROCHLORIDE 10 MG/1
10 TABLET ORAL DAILY
Qty: 90 TABLET | Refills: 1 | Status: SHIPPED | OUTPATIENT
Start: 2021-05-17 | End: 2021-09-27

## 2021-05-17 ASSESSMENT — PAIN SCALES - GENERAL: PAINLEVEL: NO PAIN (0)

## 2021-05-17 NOTE — PROGRESS NOTES
"    Assessment & Plan     Seasonal allergic rhinitis, unspecified trigger  Most of patient discomfort appears to stem from seasonal allergies. She was given a script for Flonase and Zyrtec to help with congestion.   - fluticasone (FLONASE) 50 MCG/ACT nasal spray; Spray 2 sprays into both nostrils daily  - cetirizine (ZYRTEC) 10 MG tablet; Take 1 tablet (10 mg) by mouth daily    Bilateral impacted cerumen  Patients ears were irrigated in clinic bilaterally, and patient was given a script for Debrox to prevent future impaction. She was feeling better following irrigation.  Repeat examination dd not show signs of infection  - carbamide peroxide (DEBROX) 6.5 % otic solution; Place 5 drops into both ears 2 times daily    Screening for deficiency anemia  Patient requested labs for previous anemia   - Ferritin  - CBC with platelets and differential    Vitamin D deficiency  Patient requested lab for previous deficiency.   - Vitamin D Deficiency       BMI:   Estimated body mass index is 26.14 kg/m  as calculated from the following:    Height as of 9/29/20: 1.64 m (5' 4.57\").    Weight as of this encounter: 70.3 kg (155 lb).       Return in about 1 year (around 5/17/2022) for Physical Exam.    SAADIA Puga [facility]    Matty Reynoso is a 35 year old who presents for the following health issues     HPI     Acute Illness  Acute illness concerns: ear pain  Onset/Duration: x 2 months  Symptoms:  Fever: no  Chills/Sweats: no  Headache (location?): no  Sinus Pressure: no  Conjunctivitis:  no  Ear Pain: YES-   Rhinorrhea: YES  Congestion:   Sore Throat: no  Cough: no  Wheeze: no  Decreased Appetite: no  Nausea: no  Vomiting: no  Diarrhea: no  Dysuria/Freq.: no  Dysuria or Hematuria: no  Fatigue/Achiness: no  Sick/Strep Exposure: no  Therapies tried and outcome: None  Angeles is a 36yo female presenting with a two month history of sneezing, itchy eyes, runny nose that occasionally " bleeds, a mild cough, headache, sinus pressure, and bilateral ear pain. She indicates that it does feel like allergies but her allergies have never gone on for this amount of time at this level of severity. She has tried Tylenol. Patient denies nausea, vomiting, diarrhea, chest pain, or fever.      Review of Systems   Constitutional, HEENT, cardiovascular, pulmonary, gi and gu systems are negative, except as otherwise noted.      Objective    /64   Pulse 66   Temp 98.5  F (36.9  C) (Tympanic)   Wt 70.3 kg (155 lb)   SpO2 99%   BMI 26.14 kg/m    Body mass index is 26.14 kg/m .  Physical Exam   GENERAL: healthy, alert and no distress  EYES: Eyes grossly normal to inspection, PERRL and conjunctivae and sclerae normal  HENT: ear canals blocked bilaterally with cerumen impaction, post irrigation, bilateral TM's clear, edematous and pale turbinates in bilateral nostrils with small bleeding ulceration on right middle turbinate, mouth without ulcers or lesions  NECK: mild adenopathy, no asymmetry, masses, or scars and thyroid normal to palpation  RESP: lungs clear to auscultation - no rales, rhonchi or wheezes  CV: regular rate and rhythm, normal S1 S2, no S3 or S4, no murmur, click or rub

## 2021-05-18 LAB — DEPRECATED CALCIDIOL+CALCIFEROL SERPL-MC: 29 UG/L (ref 20–75)

## 2021-05-20 ENCOUNTER — TELEPHONE (OUTPATIENT)
Dept: FAMILY MEDICINE | Facility: CLINIC | Age: 36
End: 2021-05-20

## 2021-05-20 NOTE — TELEPHONE ENCOUNTER
Please call  Angeles regarding her lab results.  Her iron storage ( ferritin) is normal but her hemoglobin remains slightly low. She should continue the use of her iron supplementation.

## 2021-05-21 NOTE — TELEPHONE ENCOUNTER
Pt called back; Result message given from Terry Danielson. Pt will continue with iron supplements.  Pt verbalized understanding, all questions answered.     Waleska Bragg RN

## 2021-05-21 NOTE — TELEPHONE ENCOUNTER
Patient Contact (via [facility] )    Attempt # 1    Was call answered?  No.  Left message on voicemail with information to call triage back.    On call back:     - Relay provider message below

## 2021-09-27 ENCOUNTER — OFFICE VISIT (OUTPATIENT)
Dept: FAMILY MEDICINE | Facility: CLINIC | Age: 36
End: 2021-09-27
Payer: COMMERCIAL

## 2021-09-27 VITALS
HEART RATE: 74 BPM | TEMPERATURE: 99.2 F | WEIGHT: 151 LBS | OXYGEN SATURATION: 98 % | HEIGHT: 65 IN | DIASTOLIC BLOOD PRESSURE: 70 MMHG | BODY MASS INDEX: 25.16 KG/M2 | SYSTOLIC BLOOD PRESSURE: 112 MMHG

## 2021-09-27 DIAGNOSIS — B37.31 YEAST VAGINITIS: Primary | ICD-10-CM

## 2021-09-27 LAB
CLUE CELLS: ABNORMAL
TRICHOMONAS, WET PREP: ABNORMAL
WBC'S/HIGH POWER FIELD, WET PREP: ABNORMAL
YEAST, WET PREP: PRESENT

## 2021-09-27 PROCEDURE — 99213 OFFICE O/P EST LOW 20 MIN: CPT | Performed by: PHYSICIAN ASSISTANT

## 2021-09-27 PROCEDURE — 87210 SMEAR WET MOUNT SALINE/INK: CPT | Performed by: PHYSICIAN ASSISTANT

## 2021-09-27 RX ORDER — FLUCONAZOLE 150 MG/1
150 TABLET ORAL DAILY
Qty: 3 TABLET | Refills: 0 | Status: SHIPPED | OUTPATIENT
Start: 2021-09-27 | End: 2021-09-30

## 2021-09-27 ASSESSMENT — MIFFLIN-ST. JEOR: SCORE: 1378.98

## 2021-09-27 NOTE — Clinical Note
Please abstract the following data from this visit with this patient into the appropriate field in Epic:    Tests that can be patient reported without a hard copy:    {Quality Abstract List (Optional):550424}    Other Tests found in the patient's chart through Chart Review/Care Everywhere:    [facility] and HPV done by [facility] on this date: 5/21/2020    Note to Abstraction: If this section is blank, no results were found via Chart Review/Care Everywhere.

## 2021-09-27 NOTE — PROGRESS NOTES
"ifluc    Assessment & Plan       ICD-10-CM    1. Yeast vaginitis  B37.3 Wet prep - Clinic Collect     fluconazole (DIFLUCAN) 150 MG tablet     Take Diflucan as prescribed. Tylenol, heating pad prn pain. Get plenty of fluids. Reviewed pelvic hygiene.      Return in about 1 week (around 10/4/2021), or if symptoms worsen or fail to improve.    [facility] is a 36 year old who presents for the following health issues  accompanied by her two young children:    HPI     Vaginal Symptoms  Onset/Duration: x 2 weeks   Description:  Vaginal Discharge: white,yelowish   Itching (Pruritis): YES  Burning sensation:  YES  Odor: no  Accompanying Signs & Symptoms: lower back pain  Urinary symptoms: YES- frequency, burning sensation   Abdominal pain: YES- pelvic pain   Fever: YES- earlier   History:   Sexually active: YES  New Partner: no  Possibility of Pregnancy:  no  Recent antibiotic use: no  Previous vaginitis issues: no  Precipitating or alleviating factors: None  Therapies tried and outcome: none      Review of Systems   Constitutional, HEENT, cardiovascular, pulmonary, GI, , musculoskeletal, neuro, skin, endocrine and psych systems are negative, except as otherwise noted.      Objective    /70   Pulse 74   Temp 99.2  F (37.3  C) (Tympanic)   Ht 1.64 m (5' 4.57\")   Wt 68.5 kg (151 lb)   LMP 09/06/2021   SpO2 98%   BMI 25.46 kg/m    Body mass index is 25.46 kg/m .  Physical Exam   GENERAL:  WDWN, no acute distress  PSYCH: pleasant, cooperative  EYES: no discharge, no injection  HENT:  Normocephalic. Moist mucus membranes.  NECK:  Supple, symmetric  EXTREMITIES:  No gross deformities, moves all 4 limbs spontaneously, no peripheral edema  : normal external female genitalia, normal urethra, vaginal mucosa pink & well rugated.  SKIN:  Warm and dry, no rash or suspicious lesions    NEUROLOGIC:  alert, sensation grossly intact.    Results for " orders placed or performed in visit on 09/27/21   Wet prep - Clinic Collect     Status: Abnormal    Specimen: Vagina; Swab   Result Value Ref Range    Trichomonas Absent Absent    Yeast Present (A) Absent    Clue Cells Absent Absent    WBCs/high power field 1+ (A) None

## 2021-12-14 ENCOUNTER — NURSE TRIAGE (OUTPATIENT)
Dept: FAMILY MEDICINE | Facility: CLINIC | Age: 36
End: 2021-12-14
Payer: COMMERCIAL

## 2021-12-14 NOTE — TELEPHONE ENCOUNTER
"  Reason for Disposition    Blood in urine (red, pink, or tea-colored)    Additional Information    Negative: Passed out (i.e., fainted, collapsed and was not responding)    Negative: Shock suspected (e.g., cold/pale/clammy skin, too weak to stand, low BP, rapid pulse)    Negative: Sounds like a life-threatening emergency to the triager    Negative: Major injury to the back (e.g., MVA, fall > 10 feet or 3 meters, penetrating injury, etc.)    Negative: Pain in the upper back over the ribs (rib cage) that radiates (travels) into the chest    Negative: Pain in the upper back over the ribs (rib cage) and worsened by coughing (or clearly increases with breathing)    Negative: SEVERE back pain of sudden onset and age > 60    Negative: SEVERE abdominal pain (e.g., excruciating)    Negative: Abdominal pain and age > 60    Negative: Unable to urinate (or only a few drops) and bladder feels very full    Negative: Loss of bladder or bowel control (urine or bowel incontinence; wetting self, leaking stool) of new onset    Negative: Numbness (loss of sensation) in groin or rectal area    Answer Assessment - Initial Assessment Questions  1. ONSET: \"When did the pain begin?\"       About 2 weeks ago  2. LOCATION: \"Where does it hurt?\" (upper, mid or lower back)      Middle back  3. SEVERITY: \"How bad is the pain?\"  (e.g., Scale 1-10; mild, moderate, or severe)    - MILD (1-3): doesn't interfere with normal activities     - MODERATE (4-7): interferes with normal activities or awakens from sleep     - SEVERE (8-10): excruciating pain, unable to do any normal activities       severe  4. PATTERN: \"Is the pain constant?\" (e.g., yes, no; constant, intermittent)       Comes and goes  5. RADIATION: \"Does the pain shoot into your legs or elsewhere?\"      no  6. CAUSE:  \"What do you think is causing the back pain?\"       Not sure  7. BACK OVERUSE:  \"Any recent lifting of heavy objects, strenuous work or exercise?\"      no  8. MEDICATIONS: " "\"What have you taken so far for the pain?\" (e.g., nothing, acetaminophen, NSAIDS)      Tylenol helps but the pain comes back  9. NEUROLOGIC SYMPTOMS: \"Do you have any weakness, numbness, or problems with bowel/bladder control?\"      no  10. OTHER SYMPTOMS: \"Do you have any other symptoms?\" (e.g., fever, abdominal pain, burning with urination, blood in urine)        Blood in urine and pain with urination  11. PREGNANCY: \"Is there any chance you are pregnant?\" (e.g., yes, no; LMP)        no    Protocols used: BACK PAIN-A-OH    GO TO ED/UCC NOW (OR TO OFFICE WITH PCP APPROVAL): * After using nurse judgment regarding the most appropriate site, tell the caller: Go to [facility]. Leave NOW.   * TRIAGER CAUTION: In selecting the most appropriate care site, you must consider both the severity of the patient's symptoms AND what resources are available at that care site.  * ED: Patients who may need surgery, sound seriously ill or may be unstable need to be sent to an ED. Likewise, so do most patients with complex medical problems and serious symptoms.  * UCC: Some UCCs can manage patients who are stable and have less serious symptoms (e.g., minor illnesses and injuries). The triager must know the UCC capabilities before sending a patient there. If unsure, call ahead.  * OFFICE: If patient sounds stable and not seriously ill, consult PCP (or follow your office policy) to see if patient can be seen NOW in office.      COLD OR HEAT:  * Cold Pack: For pain or swelling, use a cold pack or ice wrapped in a wet cloth. Put it on the sore area for 20 minutes. Repeat 4 times on the first day, then as needed.  * Heat Pack: If pain lasts over 2 days, apply heat to the sore area. Use a heat pack, heating pad, or warm wet washcloth. Do this for 10 minutes, then as needed. For widespread stiffness, take a hot bath or hot shower instead. Move the sore area under the warm water.      PAIN MEDICINES:  * For pain relief, you can take either " acetaminophen, ibuprofen, or naproxen.  * They are over-the-counter (OTC) pain drugs. You can buy them at the drugstore.  * ACETAMINOPHEN - REGULAR STRENGTH TYLENOL: Take 650 mg (two 325 mg pills) by mouth every 4-6 hours as needed. Each Regular Strength Tylenol pill has 325 mg of acetaminophen. The most you should take each day is 3,250 mg (10 pills a day).  * ACETAMINOPHEN - EXTRA STRENGTH TYLENOL: Take 1,000 mg (two 500 mg pills) every 8 hours as needed. Each Extra Strength Tylenol pill has 500 mg of acetaminophen. The most you should take each day is 3,000 mg (6 pills a day).  * IBUPROFEN (E.G., MOTRIN, ADVIL): Take 400 mg (two 200 mg pills) by mouth every 6 hours. The most you should take each day is 1,200 mg (six 200 mg pills), unless your doctor has told you to take more.  * NAPROXEN (E.G., ALEVE): Take 220 mg (one 220 mg pill) by mouth every 8 hours as needed. You may take 440 mg (two 220 mg pills) for your first dose. The most you should take each day is 660 mg (three 220 mg pills a day), unless your doctor has told you to take more.      CALL BACK IF:  * Numbness or weakness occur  * Bowel/bladder problems occur  * Pain lasts for more than 2 weeks  * You become worse        Patient/Caregiver understands and will follow care advice? Yes, plans to follow advice     Anna MCCRAY RN  EP Triage

## 2021-12-14 NOTE — TELEPHONE ENCOUNTER
Reason for Call:  Other appointment    Detailed comments:  Patient has the following symptoms painful uriation some bleeding cramping and back pain there is nor in person OV until January she doesn't want complete a phone visit. Please call patient back.    Phone Number Patient can be reached at: Cell number on file:    Telephone Information:   Mobile [phone]       Best Time: As soon as possible    Can we leave a detailed message on this number? YES    Call taken on 12/14/2021 at 10:19 AM by Tonja Traore

## 2022-10-11 ENCOUNTER — OFFICE VISIT (OUTPATIENT)
Dept: FAMILY MEDICINE | Facility: CLINIC | Age: 37
End: 2022-10-11
Payer: COMMERCIAL

## 2022-10-11 ENCOUNTER — TELEPHONE (OUTPATIENT)
Dept: FAMILY MEDICINE | Facility: CLINIC | Age: 37
End: 2022-10-11

## 2022-10-11 VITALS
DIASTOLIC BLOOD PRESSURE: 64 MMHG | HEART RATE: 76 BPM | BODY MASS INDEX: 25.8 KG/M2 | WEIGHT: 153 LBS | OXYGEN SATURATION: 99 % | SYSTOLIC BLOOD PRESSURE: 100 MMHG | TEMPERATURE: 98.8 F

## 2022-10-11 DIAGNOSIS — B37.31 VAGINAL YEAST INFECTION: ICD-10-CM

## 2022-10-11 DIAGNOSIS — N89.8 VAGINAL DISCHARGE: Primary | ICD-10-CM

## 2022-10-11 LAB
ALBUMIN UR-MCNC: 30 MG/DL
APPEARANCE UR: CLEAR
BACTERIA #/AREA URNS HPF: ABNORMAL /HPF
BILIRUB UR QL STRIP: NEGATIVE
CLUE CELLS: NORMAL
COLOR UR AUTO: YELLOW
GLUCOSE UR STRIP-MCNC: NEGATIVE MG/DL
HCG UR QL: NEGATIVE
HGB UR QL STRIP: ABNORMAL
KETONES UR STRIP-MCNC: NEGATIVE MG/DL
LEUKOCYTE ESTERASE UR QL STRIP: NEGATIVE
NITRATE UR QL: NEGATIVE
PH UR STRIP: 7 [PH] (ref 5–7)
RBC #/AREA URNS AUTO: >100 /HPF
SP GR UR STRIP: 1.02 (ref 1–1.03)
SQUAMOUS #/AREA URNS AUTO: ABNORMAL /LPF
TRICHOMONAS, WET PREP: NORMAL
UROBILINOGEN UR STRIP-ACNC: 0.2 E.U./DL
WBC #/AREA URNS AUTO: ABNORMAL /HPF
WBC'S/HIGH POWER FIELD, WET PREP: NORMAL
YEAST, WET PREP: NORMAL

## 2022-10-11 PROCEDURE — 87491 CHLMYD TRACH DNA AMP PROBE: CPT | Performed by: FAMILY MEDICINE

## 2022-10-11 PROCEDURE — 99213 OFFICE O/P EST LOW 20 MIN: CPT | Performed by: FAMILY MEDICINE

## 2022-10-11 PROCEDURE — 81025 URINE PREGNANCY TEST: CPT | Performed by: FAMILY MEDICINE

## 2022-10-11 PROCEDURE — 87210 SMEAR WET MOUNT SALINE/INK: CPT | Performed by: FAMILY MEDICINE

## 2022-10-11 PROCEDURE — 87591 N.GONORRHOEAE DNA AMP PROB: CPT | Performed by: FAMILY MEDICINE

## 2022-10-11 PROCEDURE — 81001 URINALYSIS AUTO W/SCOPE: CPT | Performed by: FAMILY MEDICINE

## 2022-10-11 RX ORDER — FLUCONAZOLE 150 MG/1
150 TABLET ORAL
Qty: 3 TABLET | Refills: 0 | Status: SHIPPED | OUTPATIENT
Start: 2022-10-11

## 2022-10-11 ASSESSMENT — PAIN SCALES - GENERAL: PAINLEVEL: MILD PAIN (2)

## 2022-10-11 NOTE — TELEPHONE ENCOUNTER
----- Message from Minog Ho MD sent at 10/11/2022  3:00 PM CDT -----  Please call and notify the patient she has some fungal infection.  We will send oral medication called Diflucan.  It is 1 150 mg, she should use it once but if symptoms still persist she can use every third day for an additional 1 to 2 tablets.

## 2022-10-11 NOTE — PROGRESS NOTES
Assessment & Plan     Vaginal discharge  Vaginal discharge symptoms for the last 2 weeks she has tried over-the-counter without significant relief we will get some blood work and follow-up on that.  - Wet prep - Clinic Collect  - UA with Microscopic reflex to Culture - lab collect; Future  - HCG qualitative urine; Future  - NEISSERIA GONORRHOEA PCR; Future  - CHLAMYDIA TRACHOMATIS PCR; Future  - UA with Microscopic reflex to Culture - lab collect  - HCG qualitative urine  - NEISSERIA GONORRHOEA PCR  - CHLAMYDIA TRACHOMATIS PCR           Return in about 2 weeks (around 10/25/2022) for if symptom does not get better.    Mingo Ho MD  [facility] ROSALIND Reynoso is a 37 year old presenting for the following health issues:  Vaginal Pain      HPI     Vaginal Symptoms  Onset/Duration: 2 months - concerned she has infection  Description:  Vaginal Discharge: YES   Itching (Pruritis): YES  Burning sensation:  No  Odor: No  Accompanying Signs & Symptoms:  Urinary symptoms: pressure   Abdominal pain: YES  Fever: No  History:   Sexually active: YES - no birth control  New Partner: No  Possibility of Pregnancy:  Unsure  Recent antibiotic use: No  Previous vaginitis issues: YES  Precipitating or alleviating factors: None  Therapies tried and outcome: none      Review of Systems   Constitutional, HEENT, cardiovascular, pulmonary, gi and gu systems are negative, except as otherwise noted.      Objective    /64   Pulse 76   Temp 98.8  F (37.1  C) (Tympanic)   Wt 69.4 kg (153 lb)   LMP 10/11/2022   SpO2 99%   Breastfeeding No   BMI 25.80 kg/m    Body mass index is 25.8 kg/m .  Physical Exam   GENERAL: healthy, alert and no distress  NECK: no adenopathy, no asymmetry, masses, or scars and thyroid normal to palpation  RESP: lungs clear to auscultation - no rales, rhonchi or wheezes  CV: regular rate and rhythm, normal S1 S2, no S3 or S4, no murmur, click or rub, no peripheral edema and  peripheral pulses strong  ABDOMEN: soft, nontender, no hepatosplenomegaly, no masses and bowel sounds normal

## 2022-10-11 NOTE — TELEPHONE ENCOUNTER
Called patient with  and relayed provider's message. Patient stated understanding and had no further questions.    Ninfa Villanueva RN  [facility] Triage Team

## 2022-10-12 LAB
C TRACH DNA SPEC QL NAA+PROBE: NEGATIVE
N GONORRHOEA DNA SPEC QL NAA+PROBE: NEGATIVE

## 2022-10-24 DIAGNOSIS — B37.31 VAGINAL YEAST INFECTION: ICD-10-CM

## 2022-10-26 RX ORDER — FLUCONAZOLE 150 MG/1
TABLET ORAL
Qty: 3 TABLET | Refills: 0 | OUTPATIENT
Start: 2022-10-26

## 2022-10-26 NOTE — TELEPHONE ENCOUNTER
This is a duplicate refill request, that has been refused to the pharmacy.   Yovani Leigh RN  [facility] Triage Nurse